# Patient Record
Sex: MALE | Race: WHITE
[De-identification: names, ages, dates, MRNs, and addresses within clinical notes are randomized per-mention and may not be internally consistent; named-entity substitution may affect disease eponyms.]

---

## 2021-07-21 ENCOUNTER — HOSPITAL ENCOUNTER (OUTPATIENT)
Dept: HOSPITAL 47 - LABWHC1 | Age: 36
Discharge: HOME | End: 2021-07-21
Payer: COMMERCIAL

## 2021-07-21 DIAGNOSIS — R53.83: ICD-10-CM

## 2021-07-21 DIAGNOSIS — R43.0: Primary | ICD-10-CM

## 2021-07-21 PROCEDURE — 86769 SARS-COV-2 COVID-19 ANTIBODY: CPT

## 2021-07-21 PROCEDURE — 36415 COLL VENOUS BLD VENIPUNCTURE: CPT

## 2021-09-12 ENCOUNTER — HOSPITAL ENCOUNTER (OUTPATIENT)
Dept: HOSPITAL 47 - RADMRIMAIN | Age: 36
Discharge: HOME | End: 2021-09-12
Payer: COMMERCIAL

## 2021-09-12 DIAGNOSIS — R43.0: Primary | ICD-10-CM

## 2021-09-12 PROCEDURE — 70553 MRI BRAIN STEM W/O & W/DYE: CPT

## 2021-09-12 NOTE — MR
EXAMINATION TYPE: MR brain wo/w con

 

DATE OF EXAM: 9/12/2021

 

COMPARISON: Head CT 11/4/2010

 

HISTORY: Anosmia.

 

TECHNIQUE: 

Multiplanar, multisequence images of the brain and brainstem is performed without and with IV contras
t, utilizing 15 mL intravenous Gadavist .

 

FINDINGS: Diffusion weighted images demonstrate no evidence of a recent infarct or other diffusion ab
normality.  There is no extra-axial fluid collection or significant white matter signal abnormality. 
 The ventricular system and cisternal spaces are normal in size and appearance.  The brain volume is 
age appropriate.

 

Midline structures demonstrate normal morphology.  The craniocervical junction appears within normal 
limits.  Post contrast images demonstrate no abnormal enhancement. The dural venous sinuses appear pa
tent. The visualized sinuses are clear and the globes are intact.

 

IMPRESSION: 

No acute intracranial process or abnormal pathologic enhancement.